# Patient Record
Sex: MALE | URBAN - METROPOLITAN AREA
[De-identification: names, ages, dates, MRNs, and addresses within clinical notes are randomized per-mention and may not be internally consistent; named-entity substitution may affect disease eponyms.]

---

## 2023-03-29 ENCOUNTER — CLAIMS CREATED FROM THE CLAIM WINDOW (OUTPATIENT)
Dept: URBAN - METROPOLITAN AREA CLINIC 107 | Facility: CLINIC | Age: 38
End: 2023-03-29
Payer: COMMERCIAL

## 2023-03-29 VITALS
DIASTOLIC BLOOD PRESSURE: 104 MMHG | HEIGHT: 76 IN | TEMPERATURE: 98.2 F | BODY MASS INDEX: 27.03 KG/M2 | WEIGHT: 222 LBS | SYSTOLIC BLOOD PRESSURE: 148 MMHG | HEART RATE: 65 BPM

## 2023-03-29 DIAGNOSIS — K92.1 HEMATOCHEZIA: ICD-10-CM

## 2023-03-29 DIAGNOSIS — K21.9 GERD: ICD-10-CM

## 2023-03-29 DIAGNOSIS — K59.01 SLOW TRANSIT CONSTIPATION: ICD-10-CM

## 2023-03-29 PROCEDURE — 99244 OFF/OP CNSLTJ NEW/EST MOD 40: CPT | Performed by: NURSE PRACTITIONER

## 2023-03-29 PROCEDURE — 99204 OFFICE O/P NEW MOD 45 MIN: CPT | Performed by: STUDENT IN AN ORGANIZED HEALTH CARE EDUCATION/TRAINING PROGRAM

## 2023-03-29 PROCEDURE — 99244 OFF/OP CNSLTJ NEW/EST MOD 40: CPT | Performed by: STUDENT IN AN ORGANIZED HEALTH CARE EDUCATION/TRAINING PROGRAM

## 2023-03-29 NOTE — HPI-TODAY'S VISIT:
37-year-old male referred by Dr. Balwinder Merchant for evaluation of hematochezia.  A copy of this document is being forwarded to the referring provider. Within the last 3 to 4 months, he has experienced intermittent episodes of red blood in the stool and with wiping. Prior to onset, he did have an exacerbation of constipation. This has improved with initiation of MiraLAX, which he is taking as needed. For years, he has experienced intermittent but infrequent bouts of sharp mid abdominal/gas pain. This typically occurs following meals, particularly with consumption of spicy food. He has occasional reflux and belching which responds to Pepcid AC if needed.

## 2023-04-24 ENCOUNTER — OFFICE VISIT (OUTPATIENT)
Dept: URBAN - METROPOLITAN AREA SURGERY CENTER 25 | Facility: SURGERY CENTER | Age: 38
End: 2023-04-24
Payer: COMMERCIAL

## 2023-04-24 DIAGNOSIS — K62.89 PROCTITIS: ICD-10-CM

## 2023-04-24 DIAGNOSIS — K92.1 ACUTE MELENA: ICD-10-CM

## 2023-04-24 DIAGNOSIS — K55.20 ANGIODYSPLASIA OF COLON WITHOUT HEMORRHAGE: ICD-10-CM

## 2023-04-24 PROCEDURE — G8907 PT DOC NO EVENTS ON DISCHARG: HCPCS | Performed by: STUDENT IN AN ORGANIZED HEALTH CARE EDUCATION/TRAINING PROGRAM

## 2023-04-24 PROCEDURE — 45380 COLONOSCOPY AND BIOPSY: CPT | Performed by: STUDENT IN AN ORGANIZED HEALTH CARE EDUCATION/TRAINING PROGRAM

## 2023-05-01 ENCOUNTER — TELEPHONE ENCOUNTER (OUTPATIENT)
Dept: URBAN - METROPOLITAN AREA CLINIC 35 | Facility: CLINIC | Age: 38
End: 2023-05-01

## 2023-05-01 RX ORDER — MESALAMINE 1000 MG/1
1 SUPPOSITORY AT BEDTIME SUPPOSITORY RECTAL ONCE A DAY
Qty: 30 | OUTPATIENT
Start: 2023-05-01 | End: 2023-05-31

## 2023-05-22 ENCOUNTER — OFFICE VISIT (OUTPATIENT)
Dept: URBAN - METROPOLITAN AREA CLINIC 113 | Facility: CLINIC | Age: 38
End: 2023-05-22
Payer: COMMERCIAL

## 2023-05-22 VITALS
BODY MASS INDEX: 27.73 KG/M2 | TEMPERATURE: 98.4 F | HEIGHT: 75 IN | HEART RATE: 80 BPM | RESPIRATION RATE: 14 BRPM | DIASTOLIC BLOOD PRESSURE: 80 MMHG | SYSTOLIC BLOOD PRESSURE: 123 MMHG | WEIGHT: 223 LBS

## 2023-05-22 DIAGNOSIS — K51.211 ULCERATIVE PROCTITIS WITH RECTAL BLEEDING: ICD-10-CM

## 2023-05-22 PROBLEM — 3951002: Status: ACTIVE | Noted: 2023-05-22

## 2023-05-22 PROCEDURE — 99213 OFFICE O/P EST LOW 20 MIN: CPT | Performed by: STUDENT IN AN ORGANIZED HEALTH CARE EDUCATION/TRAINING PROGRAM

## 2023-05-22 RX ORDER — MESALAMINE 1000 MG/1
1 SUPPOSITORY AT BEDTIME SUPPOSITORY RECTAL ONCE A DAY
Qty: 30 | Status: ACTIVE | COMMUNITY
Start: 2023-05-01 | End: 2023-05-31

## 2023-05-22 RX ORDER — ERGOCALCIFEROL 1.25 MG/1
1 CAPSULE CAPSULE ORAL
Status: ACTIVE | COMMUNITY

## 2023-05-22 RX ORDER — DOXYCYCLINE HYCLATE 100 MG/1
1 CAPSULE CAPSULE, GELATIN COATED ORAL ONCE A DAY
Status: ACTIVE | COMMUNITY

## 2023-05-22 RX ORDER — MESALAMINE 1000 MG/1
1 SUPPOSITORY AT BEDTIME SUPPOSITORY RECTAL ONCE A DAY
Qty: 90 | Refills: 1
Start: 2023-05-01 | End: 2023-11-17

## 2023-05-22 NOTE — HPI-TODAY'S VISIT:
Pt left a voicemail regarding if she can get an order for a COVID test, because she needed to get tested due to a close encounter with coworker who have been tested positive for COVID. I called back pt and referred her to the nurse triage. Stating that they will be able to help you and will contact us if there are additional steps to be done.      Patient initially seen by Chiquita Germain NP on 3/29/23 37-year-old male referred by Dr. Balwinder Merchant for evaluation of hematochezia.  A copy of this document is being forwarded to the referring provider. Within the last 3 to 4 months, he has experienced intermittent episodes of red blood in the stool and with wiping. Prior to onset, he did have an exacerbation of constipation. This has improved with initiation of MiraLAX, which he is taking as needed. For years, he has experienced intermittent but infrequent bouts of sharp mid abdominal/gas pain. This typically occurs following meals, particularly with consumption of spicy food. He has occasional reflux and belching which responds to Pepcid AC if needed. . Follow-up visit 5/22/2023 He complained of intermittent small-volume blood per rectum.  He was using MiraLAX as needed for constipation and Pepcid AC as needed for reflux. Colonoscopy 4/24/2023 for hematochezia: SLICK normal, 1 small localized angiectasia found in the cecum that was nonbleeding, diffuse mild inflammation found in the rectum, grade 1 nonbleeding internal hemorrhoids identified.  Rectal biopsies revealed chronic active proctitis. he was advised to start canasa 1g suppositories at bedtime. Labs 5/15/2023: WBC 3.9, hemoglobin 15.3, platelet 261, BUN 11, creatinine 0.99, liver enzymes unremarkable. Patient's rectal bleeding improved with the use of canasa suppositories. He notes that if he misses a dose he will have some mild bleeding.

## 2023-07-13 ENCOUNTER — OFFICE VISIT (OUTPATIENT)
Dept: URBAN - METROPOLITAN AREA CLINIC 113 | Facility: CLINIC | Age: 38
End: 2023-07-13
Payer: COMMERCIAL

## 2023-07-13 VITALS
TEMPERATURE: 97.1 F | RESPIRATION RATE: 18 BRPM | HEART RATE: 74 BPM | DIASTOLIC BLOOD PRESSURE: 99 MMHG | HEIGHT: 75 IN | BODY MASS INDEX: 27.63 KG/M2 | WEIGHT: 222.2 LBS | SYSTOLIC BLOOD PRESSURE: 136 MMHG

## 2023-07-13 DIAGNOSIS — K21.9 GERD: ICD-10-CM

## 2023-07-13 DIAGNOSIS — K51.211 ULCERATIVE PROCTITIS WITH RECTAL BLEEDING: ICD-10-CM

## 2023-07-13 PROCEDURE — 99214 OFFICE O/P EST MOD 30 MIN: CPT | Performed by: STUDENT IN AN ORGANIZED HEALTH CARE EDUCATION/TRAINING PROGRAM

## 2023-07-13 RX ORDER — DOXYCYCLINE HYCLATE 100 MG/1
1 CAPSULE CAPSULE, GELATIN COATED ORAL ONCE A DAY
Status: ACTIVE | COMMUNITY

## 2023-07-13 RX ORDER — ERGOCALCIFEROL 1.25 MG/1
1 CAPSULE CAPSULE ORAL
Status: ACTIVE | COMMUNITY

## 2023-07-13 RX ORDER — MESALAMINE 1.2 G/1
2 TABLETS WITH A MEAL TABLET, DELAYED RELEASE ORAL ONCE A DAY
Qty: 180 TABLET | Refills: 1 | OUTPATIENT
Start: 2023-07-13 | End: 2023-08-12

## 2023-07-13 RX ORDER — OMEPRAZOLE 40 MG/1
1 CAPSULE 30 MINUTES BEFORE MORNING MEAL CAPSULE, DELAYED RELEASE ORAL ONCE A DAY
Qty: 90 | Refills: 1 | OUTPATIENT
Start: 2023-07-13

## 2023-07-13 RX ORDER — MESALAMINE 1000 MG/1
1 SUPPOSITORY AT BEDTIME SUPPOSITORY RECTAL ONCE A DAY
Qty: 90 | Refills: 1 | Status: ACTIVE | COMMUNITY
Start: 2023-05-01 | End: 2023-11-17

## 2023-07-13 NOTE — HPI-TODAY'S VISIT:
Patient initially seen by Chiquita Germain NP on 3/29/23 37-year-old male referred by Dr. Balwinder Merchant for evaluation of hematochezia.  A copy of this document is being forwarded to the referring provider. Within the last 3 to 4 months, he has experienced intermittent episodes of red blood in the stool and with wiping. Prior to onset, he did have an exacerbation of constipation. This has improved with initiation of MiraLAX, which he is taking as needed. For years, he has experienced intermittent but infrequent bouts of sharp mid abdominal/gas pain. This typically occurs following meals, particularly with consumption of spicy food. He has occasional reflux and belching which responds to Pepcid AC if needed. . Follow-up visit 5/22/2023 He complained of intermittent small-volume blood per rectum.  He was using MiraLAX as needed for constipation and Pepcid AC as needed for reflux. Colonoscopy 4/24/2023 for hematochezia: SLICK normal, 1 small localized angiectasia found in the cecum that was nonbleeding, diffuse mild inflammation found in the rectum, grade 1 nonbleeding internal hemorrhoids identified.  Rectal biopsies revealed chronic active proctitis. he was advised to start canasa 1g suppositories at bedtime. Labs 5/15/2023: WBC 3.9, hemoglobin 15.3, platelet 261, BUN 11, creatinine 0.99, liver enzymes unremarkable. Patient's rectal bleeding improved with the use of canasa suppositories. He notes that if he misses a dose he will have some mild bleeding. . Follow-up 7/13/2023 Patient states overall he is doing well, when he takes his Canasa suppositories he has no rectal bleeding.  He does admit to forgetting this medication at times.  When he is not taking mesalamine suppositories he does note increasing mucoid discharge and mild rectal bleeding.  He does complain of epigastric burning and reflux that is related to ingestion of spicy foods intubated based foods.  He has used Pepcid AC as needed to treat this.

## 2023-10-31 ENCOUNTER — OFFICE VISIT (OUTPATIENT)
Dept: URBAN - METROPOLITAN AREA CLINIC 113 | Facility: CLINIC | Age: 38
End: 2023-10-31
Payer: COMMERCIAL

## 2023-10-31 ENCOUNTER — DASHBOARD ENCOUNTERS (OUTPATIENT)
Age: 38
End: 2023-10-31

## 2023-10-31 VITALS
WEIGHT: 213 LBS | BODY MASS INDEX: 26.49 KG/M2 | TEMPERATURE: 97.6 F | DIASTOLIC BLOOD PRESSURE: 90 MMHG | HEIGHT: 75 IN | SYSTOLIC BLOOD PRESSURE: 124 MMHG | HEART RATE: 78 BPM

## 2023-10-31 DIAGNOSIS — K21.9 GERD: ICD-10-CM

## 2023-10-31 DIAGNOSIS — K51.211 ULCERATIVE PROCTITIS WITH RECTAL BLEEDING: ICD-10-CM

## 2023-10-31 PROCEDURE — 99214 OFFICE O/P EST MOD 30 MIN: CPT | Performed by: STUDENT IN AN ORGANIZED HEALTH CARE EDUCATION/TRAINING PROGRAM

## 2023-10-31 RX ORDER — ERGOCALCIFEROL 1.25 MG/1
1 CAPSULE CAPSULE ORAL
Status: ACTIVE | COMMUNITY

## 2023-10-31 RX ORDER — MESALAMINE 1000 MG/1
1 SUPPOSITORY AT BEDTIME SUPPOSITORY RECTAL ONCE A DAY
Qty: 90 | Refills: 1 | Status: ACTIVE | COMMUNITY
Start: 2023-05-01 | End: 2023-11-17

## 2023-10-31 RX ORDER — OMEPRAZOLE 40 MG/1
1 CAPSULE 30 MINUTES BEFORE MORNING MEAL CAPSULE, DELAYED RELEASE ORAL ONCE A DAY
Qty: 90 | Refills: 1 | Status: ACTIVE | COMMUNITY
Start: 2023-07-13

## 2023-10-31 RX ORDER — DOXYCYCLINE HYCLATE 100 MG/1
1 CAPSULE CAPSULE, GELATIN COATED ORAL ONCE A DAY
Status: ON HOLD | COMMUNITY

## 2023-10-31 NOTE — HPI-TODAY'S VISIT:
Patient initially seen by Chiquita Germain NP on 3/29/23 37-year-old male referred by Dr. Balwinder Merchant for evaluation of hematochezia.  A copy of this document is being forwarded to the referring provider. Within the last 3 to 4 months, he has experienced intermittent episodes of red blood in the stool and with wiping. Prior to onset, he did have an exacerbation of constipation. This has improved with initiation of MiraLAX, which he is taking as needed. For years, he has experienced intermittent but infrequent bouts of sharp mid abdominal/gas pain. This typically occurs following meals, particularly with consumption of spicy food. He has occasional reflux and belching which responds to Pepcid AC if needed. . Follow-up visit 5/22/2023 He complained of intermittent small-volume blood per rectum.  He was using MiraLAX as needed for constipation and Pepcid AC as needed for reflux. Colonoscopy 4/24/2023 for hematochezia: SLICK normal, 1 small localized angiectasia found in the cecum that was nonbleeding, diffuse mild inflammation found in the rectum, grade 1 nonbleeding internal hemorrhoids identified.  Rectal biopsies revealed chronic active proctitis. he was advised to start canasa 1g suppositories at bedtime. Labs 5/15/2023: WBC 3.9, hemoglobin 15.3, platelet 261, BUN 11, creatinine 0.99, liver enzymes unremarkable. Patient's rectal bleeding improved with the use of canasa suppositories. He notes that if he misses a dose he will have some mild bleeding. . Follow-up 7/13/2023 Patient states overall he is doing well, when he takes his Canasa suppositories he has no rectal bleeding.  He does admit to forgetting this medication at times.  When he is not taking mesalamine suppositories he does note increasing mucoid discharge and mild rectal bleeding.  He does complain of epigastric burning and reflux that is related to ingestion of spicy foods intubated based foods.  He has used Pepcid AC as needed to treat this. . Follow-up visit 10 3/1/2023 Patient states that he no longer has rectal bleeding.  He is using mesalamine 2.4 g daily along with Canasa suppositories.  He does admit to forgetting some suppositories at night.  He completed his trial of PPI twice daily, he does describe abdominal bloating with pain in the morning after meals.  There is a possible relationship to gluten as he notes spaghetti and pizza as a problem foods.  He denies diarrhea.  He does admit to using Advil more frequently.